# Patient Record
Sex: MALE | Race: BLACK OR AFRICAN AMERICAN | Employment: FULL TIME | URBAN - METROPOLITAN AREA
[De-identification: names, ages, dates, MRNs, and addresses within clinical notes are randomized per-mention and may not be internally consistent; named-entity substitution may affect disease eponyms.]

---
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
## 2022-03-17 ENCOUNTER — APPOINTMENT (OUTPATIENT)
Dept: CT IMAGING | Age: 22
End: 2022-03-17
Attending: EMERGENCY MEDICINE
Payer: MEDICAID

## 2022-03-17 ENCOUNTER — HOSPITAL ENCOUNTER (EMERGENCY)
Age: 22
Discharge: HOME | End: 2022-03-17
Attending: EMERGENCY MEDICINE
Payer: MEDICAID

## 2022-03-17 VITALS
DIASTOLIC BLOOD PRESSURE: 70 MMHG | BODY MASS INDEX: 27.86 KG/M2 | OXYGEN SATURATION: 100 % | HEART RATE: 87 BPM | RESPIRATION RATE: 16 BRPM | HEIGHT: 71 IN | SYSTOLIC BLOOD PRESSURE: 110 MMHG | WEIGHT: 199 LBS | TEMPERATURE: 97.9 F

## 2022-03-17 DIAGNOSIS — S06.0X0A CONCUSSION WITHOUT LOSS OF CONSCIOUSNESS, INITIAL ENCOUNTER: Primary | ICD-10-CM

## 2022-03-17 PROCEDURE — 99284 EMERGENCY DEPT VISIT MOD MDM: CPT

## 2022-03-17 PROCEDURE — 70450 CT HEAD/BRAIN W/O DYE: CPT

## 2022-03-17 RX ORDER — ONDANSETRON 4 MG/1
4 TABLET, FILM COATED ORAL
Qty: 12 TABLET | Refills: 0 | Status: SHIPPED | OUTPATIENT
Start: 2022-03-17 | End: 2022-08-24

## 2022-03-17 RX ORDER — IBUPROFEN 800 MG/1
800 TABLET ORAL EVERY 8 HOURS
Qty: 9 TABLET | Refills: 0 | Status: SHIPPED | OUTPATIENT
Start: 2022-03-17 | End: 2022-03-20

## 2022-03-17 NOTE — ED TRIAGE NOTES
Pt.AOx4, steady gait, NAD, c/o headache/dizziness after hitting head on machine @ work last week, denies LOC, denies taking blood thinners

## 2022-03-18 NOTE — ED PROVIDER NOTES
EMERGENCY DEPARTMENT HISTORY AND PHYSICAL EXAM    8:28 PM seen at this time in the triage room      Date: 3/17/2022  Patient Name: Ladonna Miles    History of Presenting Illness     Chief Complaint   Patient presents with    Headache         History Provided By: patient    Additional History (Context): Ladonna Miles is a 25 y.o. male presents with bumped his head twice today on a piece of construction equipment struck the vertex of his head. No bleeding laceration or other alarming findings. Rates his pain 2 out of 10. He notes he has to stop exertional activity. Occasional nausea. No focal neurologic deficit. Narda Franklin PCP: Willena Dakins, MD    Chief Complaint:   Duration:    Timing:    Location:   Quality:   Severity:   Modifying Factors:   Associated Symptoms:       Current Outpatient Medications   Medication Sig Dispense Refill    ibuprofen (MOTRIN) 800 mg tablet Take 1 Tablet by mouth every eight (8) hours for 3 days. 9 Tablet 0    ondansetron hcl (Zofran) 4 mg tablet Take 1 Tablet by mouth every eight (8) hours as needed for Nausea. 12 Tablet 0       Past History     Past Medical History:  History reviewed. No pertinent past medical history. Past Surgical History:  No past surgical history on file. Family History:  History reviewed. No pertinent family history. Social History:  Social History     Tobacco Use    Smoking status: Not on file    Smokeless tobacco: Not on file   Substance Use Topics    Alcohol use: Not on file    Drug use: Not on file       Allergies:  No Known Allergies      Review of Systems     Review of Systems   Constitutional: Negative for diaphoresis and fever. HENT: Negative for congestion and sore throat. Eyes: Negative for pain and itching. Respiratory: Negative for cough and shortness of breath. Cardiovascular: Negative for chest pain and palpitations. Gastrointestinal: Negative for abdominal pain and diarrhea.    Endocrine: Negative for polydipsia and polyuria. Genitourinary: Negative for dysuria and hematuria. Musculoskeletal: Negative for arthralgias and myalgias. Skin: Negative for rash and wound. Neurological: Positive for headaches. Negative for seizures and syncope. Hematological: Does not bruise/bleed easily. Psychiatric/Behavioral: Negative for agitation and hallucinations. Physical Exam       Patient Vitals for the past 12 hrs:   Temp Pulse Resp BP SpO2   03/17/22 1943 97.9 °F (36.6 °C) 87 16 110/70 100 %       IPVITALS  Patient Vitals for the past 24 hrs:   BP Temp Pulse Resp SpO2 Height Weight   03/17/22 1943 110/70 97.9 °F (36.6 °C) 87 16 100 % 5' 11\" (1.803 m) 90.3 kg (199 lb)       Physical Exam  Vitals and nursing note reviewed. Constitutional:       Appearance: He is well-developed. HENT:      Head: Normocephalic and atraumatic. Eyes:      General: No scleral icterus. Conjunctiva/sclera: Conjunctivae normal.   Neck:      Vascular: No JVD. Cardiovascular:      Rate and Rhythm: Normal rate and regular rhythm. Pulmonary:      Effort: Pulmonary effort is normal. No respiratory distress. Musculoskeletal:         General: Normal range of motion. Cervical back: Normal range of motion and neck supple. Skin:     General: Skin is warm and dry. Neurological:      General: No focal deficit present. Mental Status: He is alert. Cranial Nerves: No cranial nerve deficit. Psychiatric:         Thought Content: Thought content normal.         Judgment: Judgment normal.           Diagnostic Study Results   Labs -  No results found for this or any previous visit (from the past 24 hour(s)). Radiologic Studies -   CT HEAD WO CONT   Final Result   1. No acute intracranial process identified. CT HEAD WO CONT    Result Date: 3/17/2022  EXAM: CT of the Head without contrast INDICATION:  Struck head TECHNIQUE: CT of the head from the vertex to the skull base performed. No IV contrast administered.  All CT scans at this facility are performed using dose optimization technique as appropriate to a performed exam, to include automated exposure control, adjustment of the mA and/or kV according to patient size (including appropriate matching for site specific examination) or use of iterative reconstruction technique. COMPARISON: None. FINDINGS: No evidence of acute intra-axial or extra-axial hemorrhage. The ventricles and sulci are symmetric. No midline shift, mass effect or mass lesion appreciated. The gray-white junction is preserved. No evidence of an acute infarct identified. The mastoid air cells are well aerated. The paranasal sinuses are unremarkable. The orbits are normal. The scalp and skull are unremarkable. 1.   No acute intracranial process identified. Medications ordered:   Medications - No data to display      Medical Decision Making   Initial Medical Decision Making and DDx:  Physical exam no findings of trauma that would be concerning depressed skull fracture laceration  Patient has signs of acute concussion, headache nausea occasional lethargy or lack of energy. No focal deficit doubt intracranial lesion bleeding etc.    ED Course: Progress Notes, Reevaluation, and Consults:     8:58 PM prabhjot with the patient and father over the phone, he is concerned that the patient struck his head twice turns out the actual schedule was once last week and then again today, has been having nosebleeds. Very tangential story, had informed discussion with the 3 of us will proceed with head CT    I am the first provider for this patient. I reviewed the vital signs, available nursing notes, past medical history, past surgical history, family history and social history. Patient Vitals for the past 12 hrs:   Temp Pulse Resp BP SpO2   03/17/22 1943 97.9 °F (36.6 °C) 87 16 110/70 100 %       Vital Signs-Reviewed the patient's vital signs.     Pulse Oximetry Analysis, Cardiac Monitor, 12 lead ekg: Interpreted by the EP. Records Reviewed: Nursing notes reviewed (Time of Review: 8:28 PM)    Procedures:   Critical Care Time:   Aspirin: (was aspirin given for stroke?)    Diagnosis     Clinical Impression:   1. Concussion without loss of consciousness, initial encounter        Disposition: Discharged      Follow-up Information     Follow up With Specialties Details Why Contact Info    Alesha Savage MD Family Medicine In 3 days  [address] [facility]  [phone]             Patient's Medications   Start Taking    IBUPROFEN (MOTRIN) 800 MG TABLET    Take 1 Tablet by mouth every eight (8) hours for 3 days. ONDANSETRON HCL (ZOFRAN) 4 MG TABLET    Take 1 Tablet by mouth every eight (8) hours as needed for Nausea.    Continue Taking    No medications on file   These Medications have changed    No medications on file   Stop Taking    No medications on file     _______________________________    Notes:    Darryl Foley MD using Dragon dictation      _______________________________

## 2022-03-18 NOTE — ROUTINE PROCESS
Damari Hernandez is a 25 y.o. male that was discharged in stable. Pt was accompanied by self. Pt is driving. The patients diagnosis, condition and treatment were explained to  patient and aftercare instructions were given. The patient verbalized understanding. Patient armband removed and shredded.

## 2022-04-12 ENCOUNTER — HOSPITAL ENCOUNTER (EMERGENCY)
Age: 22
Discharge: HOME | End: 2022-04-12
Attending: STUDENT IN AN ORGANIZED HEALTH CARE EDUCATION/TRAINING PROGRAM
Payer: MEDICAID

## 2022-04-12 VITALS
DIASTOLIC BLOOD PRESSURE: 74 MMHG | SYSTOLIC BLOOD PRESSURE: 136 MMHG | HEIGHT: 72 IN | HEART RATE: 55 BPM | TEMPERATURE: 98 F | BODY MASS INDEX: 26.82 KG/M2 | OXYGEN SATURATION: 100 % | WEIGHT: 198 LBS | RESPIRATION RATE: 18 BRPM

## 2022-04-12 DIAGNOSIS — G44.52 NEW DAILY PERSISTENT HEADACHE: Primary | ICD-10-CM

## 2022-04-12 LAB
COVID-19 RAPID TEST, COVR: NOT DETECTED
SOURCE, COVRS: NORMAL

## 2022-04-12 PROCEDURE — 87635 SARS-COV-2 COVID-19 AMP PRB: CPT

## 2022-04-12 PROCEDURE — 74011250636 HC RX REV CODE- 250/636: Performed by: EMERGENCY MEDICINE

## 2022-04-12 PROCEDURE — 96372 THER/PROPH/DIAG INJ SC/IM: CPT

## 2022-04-12 PROCEDURE — 99284 EMERGENCY DEPT VISIT MOD MDM: CPT

## 2022-04-12 RX ORDER — KETOROLAC TROMETHAMINE 15 MG/ML
15 INJECTION, SOLUTION INTRAMUSCULAR; INTRAVENOUS
Status: COMPLETED | OUTPATIENT
Start: 2022-04-12 | End: 2022-04-12

## 2022-04-12 RX ORDER — METOCLOPRAMIDE HYDROCHLORIDE 5 MG/ML
10 INJECTION INTRAMUSCULAR; INTRAVENOUS
Status: COMPLETED | OUTPATIENT
Start: 2022-04-12 | End: 2022-04-12

## 2022-04-12 RX ORDER — DIPHENHYDRAMINE HYDROCHLORIDE 50 MG/ML
50 INJECTION, SOLUTION INTRAMUSCULAR; INTRAVENOUS ONCE
Status: COMPLETED | OUTPATIENT
Start: 2022-04-12 | End: 2022-04-12

## 2022-04-12 RX ADMIN — KETOROLAC TROMETHAMINE 15 MG: 15 INJECTION, SOLUTION INTRAMUSCULAR; INTRAVENOUS at 09:01

## 2022-04-12 RX ADMIN — DIPHENHYDRAMINE HYDROCHLORIDE 50 MG: 50 INJECTION, SOLUTION INTRAMUSCULAR; INTRAVENOUS at 09:02

## 2022-04-12 RX ADMIN — METOCLOPRAMIDE HYDROCHLORIDE 10 MG: 5 INJECTION INTRAMUSCULAR; INTRAVENOUS at 09:02

## 2022-04-12 NOTE — ED TRIAGE NOTES
Pt ambulatory to triage without assistance. Pt alert and oriented x 4. Pt reports frontal headache starting yesterday. Motrin at home with mild relief.   Pt rates 5/10 pain

## 2022-04-12 NOTE — Clinical Note
[facility] EMERGENCY DEPT  [address] [address] [phone]    Work/School Note    Date: 4/12/2022    To Whom It May concern:      Jerrod Li was seen and treated today in the emergency room by the following provider(s):  Attending Provider: Colen Lanes, MD  Physician Assistant: JESS Lennon. Jerrod Li is excused from work/school on 04/12/22. He is clear to return to work/school on 04/13/22.         Sincerely,          JESS Parnell

## 2022-04-12 NOTE — ED PROVIDER NOTES
EMERGENCY DEPARTMENT HISTORY AND PHYSICAL EXAM    Date: 4/12/2022  Patient Name: David Riley    History of Presenting Illness     Chief Complaint   Patient presents with    Headache         History Provided By: Patient    Additional History (Context): David Riley is a 25 y.o. male with No significant past medical history who presents with complaint of headache. He has had a headache every day since his head injury 6 weeks ago. He had a head CT after that was fine. Denies any nausea vomiting diarrhea congestion vision changes numbness or weakness. His headache is frontal.  Not taking any medications for relief. PCP: Niraj Crane MD    Current Outpatient Medications   Medication Sig Dispense Refill    ondansetron hcl (Zofran) 4 mg tablet Take 1 Tablet by mouth every eight (8) hours as needed for Nausea. 12 Tablet 0       Past History     Past Medical History:  No past medical history on file. Past Surgical History:  No past surgical history on file. Family History:  No family history on file. Social History:  Social History     Tobacco Use    Smoking status: Not on file    Smokeless tobacco: Not on file   Substance Use Topics    Alcohol use: Not on file    Drug use: Not on file       Allergies:  No Known Allergies      Review of Systems   Review of Systems   Constitutional: Negative for fatigue and fever. HENT: Negative for congestion. Eyes: Negative for visual disturbance. Gastrointestinal: Negative for diarrhea, nausea and vomiting. Musculoskeletal: Negative for gait problem, neck pain and neck stiffness. Neurological: Positive for headaches. Negative for dizziness, weakness and numbness.      All Other Systems Negative  Physical Exam     Vitals:    04/12/22 0756 04/12/22 0759   BP: (!) 83/64 136/74   Pulse: (!) 55    Resp: 18    Temp: 98 °F (36.7 °C)    SpO2: 100%    Weight: 89.8 kg (198 lb)    Height: 6' (1.829 m)      Physical Exam  Vitals and nursing note reviewed. Constitutional:       General: He is not in acute distress. Appearance: Normal appearance. He is well-developed and normal weight. He is not ill-appearing, toxic-appearing or diaphoretic. HENT:      Head: Normocephalic and atraumatic. Eyes:      Extraocular Movements: Extraocular movements intact. Neck:      Thyroid: No thyromegaly. Vascular: No carotid bruit. Trachea: No tracheal deviation. Cardiovascular:      Rate and Rhythm: Normal rate and regular rhythm. Heart sounds: Normal heart sounds. No murmur heard. No friction rub. No gallop. Pulmonary:      Effort: Pulmonary effort is normal. No respiratory distress. Breath sounds: Normal breath sounds. No stridor. No wheezing or rales. Chest:      Chest wall: No tenderness. Abdominal:      General: There is no distension. Palpations: Abdomen is soft. There is no mass. Tenderness: There is no abdominal tenderness. There is no guarding or rebound. Musculoskeletal:         General: Normal range of motion. Cervical back: Normal range of motion and neck supple. Skin:     General: Skin is warm and dry. Coloration: Skin is not pale. Neurological:      Mental Status: He is alert. Psychiatric:         Speech: Speech normal.         Behavior: Behavior normal.         Thought Content: Thought content normal.         Judgment: Judgment normal.              Diagnostic Study Results     Labs -     Recent Results (from the past 12 hour(s))   COVID-19 RAPID TEST    Collection Time: 04/12/22  8:48 AM   Result Value Ref Range    Specimen source Nasopharyngeal      COVID-19 rapid test Not detected NOTD         Radiologic Studies -   No orders to display     CT Results  (Last 48 hours)    None        CXR Results  (Last 48 hours)    None            Medical Decision Making   I am the first provider for this patient.     I reviewed the vital signs, available nursing notes, past medical history, past surgical history, family history and social history. Vital Signs-Reviewed the patient's vital signs. Records Reviewed: Nursing Notes    Procedures:  Procedures    Provider Notes (Medical Decision Making): Negative CT. Very unremarkable exam.  Negative Covid. Would have him follow-up with neurology as an outpatient. Migraine cocktail improved for headache. MED RECONCILIATION:  No current facility-administered medications for this encounter. Current Outpatient Medications   Medication Sig    ondansetron hcl (Zofran) 4 mg tablet Take 1 Tablet by mouth every eight (8) hours as needed for Nausea. Disposition:  home    DISCHARGE NOTE:   9:59 AM    Pt has been reexamined. Patient has no new complaints, changes, or physical findings. Care plan outlined and precautions discussed. Results of labs were reviewed with the patient. All medications were reviewed with the patient. All of pt's questions and concerns were addressed. Patient was instructed and agrees to follow up with neurology, as well as to return to the ED upon further deterioration. Patient is ready to go home. Follow-up Information     Follow up With Specialties Details Why Contact Info    Wendy Coppola MD Family Medicine Schedule an appointment as soon as possible for a visit in 2 days  [address] [place] [address] Dr Hoang Guerrero MD Neurology Schedule an appointment as soon as possible for a visit in 1 day  [address] [facility] EMERGENCY DEPT Emergency Medicine  If symptoms worsen return immediately [address]  [phone]          Current Discharge Medication List            Diagnosis     Clinical Impression:   1.  New daily persistent headache

## 2022-08-24 ENCOUNTER — OFFICE VISIT (OUTPATIENT)
Dept: NEUROLOGY | Age: 22
End: 2022-08-24
Payer: MEDICAID

## 2022-08-24 VITALS
RESPIRATION RATE: 16 BRPM | HEART RATE: 63 BPM | OXYGEN SATURATION: 97 % | WEIGHT: 197 LBS | DIASTOLIC BLOOD PRESSURE: 60 MMHG | HEIGHT: 72 IN | BODY MASS INDEX: 26.68 KG/M2 | SYSTOLIC BLOOD PRESSURE: 104 MMHG

## 2022-08-24 DIAGNOSIS — G44.311 INTRACTABLE ACUTE POST-TRAUMATIC HEADACHE: Primary | ICD-10-CM

## 2022-08-24 PROCEDURE — 99204 OFFICE O/P NEW MOD 45 MIN: CPT | Performed by: STUDENT IN AN ORGANIZED HEALTH CARE EDUCATION/TRAINING PROGRAM

## 2022-08-24 RX ORDER — BUTALBITAL, ACETAMINOPHEN AND CAFFEINE 50; 325; 40 MG/1; MG/1; MG/1
1 TABLET ORAL
Qty: 20 TABLET | Refills: 3 | Status: SHIPPED | OUTPATIENT
Start: 2022-08-24

## 2022-08-24 RX ORDER — AMITRIPTYLINE HYDROCHLORIDE 25 MG/1
25 TABLET, FILM COATED ORAL
Qty: 30 TABLET | Refills: 3 | Status: SHIPPED | OUTPATIENT
Start: 2022-08-24 | End: 2022-09-23

## 2022-08-24 NOTE — PROGRESS NOTES
Kristen Hernandez is a 23 y.o. male . presents for Migraine    A 23years old male patient with medical history of ADHD [self-reported] not currently taking any medications referred here for evaluation of headache. In March 2022, he bumped into the forklift he was working on his forehead at his workplace. He loses consciousness. No laceration or swelling over the forehead. He started to have headaches since then. Headaches are over the bilateral frontal area and might also involve the vertex. It is aching and throbbing sensation. Most of the time, is around 3-4/10 but occasionally might increase to 9/10. Severe headache lasts only for few seconds where he has to put his head down. Not associated with nausea or vomiting. No photophobia or phonophobia. No changes in his vision. No diplopia. He occasionally feels dizzy/lightheaded when he tries to move fast.  He currently has headaches almost every day. Sometimes might wake him up from sleep. He was seen in the emergency room at the time of the accident on March 17, 2022. Had a CT scan of the brain which was unremarkable. He was given ibuprofen and Zofran. Claims ibuprofen is not helping. He also has over-the-counter Excedrin which is not helping either. Returned back to the emergency room for persistent headache on April 12. He was also given sumatriptan 25 mg; said is not helping. He denied any previous history of headache before the accident. He had COVID infection in January 2021; was not admitted to the hospital.  Since then, he has difficulty sleeping at nighttime. He does not have any weakness of his extremities. And difficulty with his balance. No forgetfulness. He is still working as a . Sometimes might get severe headaches at his workplace. Review of Systems   Constitutional:  Positive for weight loss. Negative for chills and fever. HENT:  Negative for hearing loss and tinnitus.     Eyes:  Negative for blurred vision and double vision. Respiratory:  Negative for cough and shortness of breath. Cardiovascular:  Negative for chest pain and leg swelling. Gastrointestinal:  Positive for heartburn. Negative for nausea and vomiting. Genitourinary:  Negative for dysuria, frequency and urgency. Musculoskeletal:  Positive for back pain and neck pain. Skin:  Negative for itching and rash. Neurological:  Positive for dizziness (soemtimes) and headaches. Negative for tingling, tremors, sensory change, speech change, focal weakness, seizures and loss of consciousness. Psychiatric/Behavioral:  Negative for depression and memory loss. The patient has insomnia. The patient is not nervous/anxious. History reviewed. No pertinent past medical history. No past surgical history on file. No family history on file. Social History     Socioeconomic History    Marital status: SINGLE     Spouse name: Not on file    Number of children: Not on file    Years of education: Not on file    Highest education level: Not on file   Occupational History    Not on file   Tobacco Use    Smoking status: Never    Smokeless tobacco: Never   Substance and Sexual Activity    Alcohol use: Not on file    Drug use: Not on file    Sexual activity: Not on file   Other Topics Concern    Not on file   Social History Narrative    Not on file     Social Determinants of Health     Financial Resource Strain: Not on file   Food Insecurity: Not on file   Transportation Needs: Not on file   Physical Activity: Not on file   Stress: Not on file   Social Connections: Not on file   Intimate Partner Violence: Not on file   Housing Stability: Not on file        No Known Allergies      Current Outpatient Medications   Medication Sig Dispense Refill    amitriptyline (ELAVIL) 25 mg tablet Take 1 Tablet by mouth nightly for 30 days.  30 Tablet 3    butalbital-acetaminophen-caffeine (FIORICET, ESGIC) -40 mg per tablet Take 1 Tablet by mouth every twelve (12) hours as needed for Headache. 20 Tablet 3         Physical Exam  Constitutional:       Appearance: Normal appearance. HENT:      Head: Normocephalic and atraumatic. Mouth/Throat:      Mouth: Mucous membranes are moist.      Pharynx: Oropharynx is clear. No oropharyngeal exudate. Eyes:      Extraocular Movements: Extraocular movements intact. Pupils: Pupils are equal, round, and reactive to light. Pulmonary:      Effort: Pulmonary effort is normal. No respiratory distress. Musculoskeletal:         General: Normal range of motion. Cervical back: Normal range of motion and neck supple. Right lower leg: No edema. Left lower leg: No edema. Neurological:      Mental Status: He is alert. Comments: Mental status: Awake, alert, oriented x3, follows simple and complex commands, no neglect, no extinction to DSS or VSS, immediate recall 3/3 and delayed recall 2/3. Speech and languge: fluent, coherent,  and comprehension intact  CN: VFF, EOMI, PERRLA, face sensation intact , no facial asymmetry noted, palate elevation symmetric bilat, SS+SCM 5/5 bilat, tongue midline  Motor: no pronator drift, tone normal throughout, strength 5/5 throughout  Sensory: intact to light touch and PP  throughout. Negative Romberg. Coordination: FNF accurate w/o dysmetria. DTR: 2+ throughout  Gait: Normal.             No visits with results within 3 Month(s) from this visit. Latest known visit with results is:   Admission on 04/12/2022, Discharged on 04/12/2022   Component Date Value Ref Range Status    Specimen source 04/12/2022 Nasopharyngeal    Final    COVID-19 rapid test 04/12/2022 Not detected  NOTD   Final    Comment: Rapid Abbott ID Now       Rapid NAAT:  The specimen is NEGATIVE for SARS-CoV-2, the novel coronavirus associated with COVID-19.        Negative results should be treated as presumptive and, if inconsistent with clinical signs and symptoms or necessary for patient management, should be tested with an alternative molecular assay. Negative results do not preclude SARS-CoV-2 infection and should not be used as the sole basis for patient management decisions. This test has been authorized by the FDA under an Emergency Use Authorization (EUA) for use by authorized laboratories. Fact sheet for Healthcare Providers: ConventionUpdate.co.nz  Fact sheet for Patients: ConventionUpdate.co.nz       Methodology: Isothermal Nucleic Acid Amplification               ICD-10-CM ICD-9-CM    1. Intractable acute post-traumatic headache  G44.311 339.21 amitriptyline (ELAVIL) 25 mg tablet      butalbital-acetaminophen-caffeine (FIORICET, ESGIC) -40 mg per tablet        A 23years old male patient referred here for evaluation of headache after trauma on March 17, 2022. He bumped in to a forklift with his forehead . No loss of consciousness. Started to have headaches since after the trauma. Has daily persistent headache since then. Mostly milder headaches where he cannot function [still working as a ]; but sometimes might have severe attacks lasting for few seconds. Over-the-counter medications not helping. Was also given sumatriptan 25 mg which he is not taking currently. Neuro exam is nonfocal.  CT scan of the brain from March 17 was also normal.  Posttraumatic headaches are common with mild traumas; can present as either tension type or migraine-like headaches. We will start him on amitriptyline 25 mg to be taken nightly. I will also start him on Fioricet for acute attacks. Discussed the need to limit use of pain medications to not more than 2 or 3 days a week. Discussed the need for regular sleep, regular eating, and exercise. We will see him again in 3 months time.

## 2022-08-24 NOTE — PROGRESS NOTES
Damon Vizcaino presents today for   Chief Complaint   Patient presents with    Migraine       Is someone accompanying this pt? no    Is the patient using any DME equipment during OV? no    Depression Screening:  No flowsheet data found. Learning Assessment:  No flowsheet data found. Abuse Screening:  No flowsheet data found. Fall Risk  No flowsheet data found. Coordination of Care:  1. Have you been to the ER, urgent care clinic since your last visit? Hospitalized since your last visit? no    2. Have you seen or consulted any other health care providers outside of the [address] [place] [address] since your last visit? Include any pap smears or colon screening.  no

## 2022-08-24 NOTE — LETTER
8/24/2022    Patient: Desmond Contreras   YOB: 2000   Date of Visit: 8/24/2022     Tobias Neal MD  [address] [address]  Via Fax: [address], PA-C  [address] [address]  Via Fax: [phone]    Dear Josephus Ruddle, MD Valaria Halsted, PA-C,      Thank you for referring Mr. Desmond Contreras to [facility] for evaluation. My notes for this consultation are attached. If you have questions, please do not hesitate to call me. I look forward to following your patient along with you.       Sincerely,    Laurent Herrera MD

## 2022-12-01 ENCOUNTER — OFFICE VISIT (OUTPATIENT)
Dept: NEUROLOGY | Age: 22
End: 2022-12-01
Payer: MEDICAID

## 2022-12-01 VITALS
OXYGEN SATURATION: 97 % | BODY MASS INDEX: 27.3 KG/M2 | WEIGHT: 201.6 LBS | HEIGHT: 72 IN | RESPIRATION RATE: 18 BRPM | HEART RATE: 74 BPM | DIASTOLIC BLOOD PRESSURE: 80 MMHG | SYSTOLIC BLOOD PRESSURE: 124 MMHG

## 2022-12-01 DIAGNOSIS — R51.9 CHRONIC DAILY HEADACHE: ICD-10-CM

## 2022-12-01 DIAGNOSIS — R51.9 CHRONIC DAILY HEADACHE: Primary | ICD-10-CM

## 2022-12-01 RX ORDER — LANOLIN ALCOHOL/MO/W.PET/CERES
CREAM (GRAM) TOPICAL
COMMUNITY
End: 2022-12-01

## 2022-12-01 RX ORDER — DOXYCYCLINE 100 MG/1
100 CAPSULE ORAL 2 TIMES DAILY
COMMUNITY
End: 2022-12-01

## 2022-12-01 RX ORDER — ERGOCALCIFEROL 1.25 MG/1
50000 CAPSULE ORAL
COMMUNITY
End: 2022-12-01

## 2022-12-01 RX ORDER — TRAZODONE HYDROCHLORIDE 100 MG/1
100 TABLET ORAL
COMMUNITY
End: 2022-12-01

## 2022-12-01 RX ORDER — MUPIROCIN CALCIUM 20 MG/G
CREAM TOPICAL 2 TIMES DAILY
COMMUNITY
End: 2022-12-01

## 2022-12-01 RX ORDER — BUPROPION HYDROCHLORIDE 150 MG/1
150 TABLET ORAL DAILY
COMMUNITY
End: 2022-12-01

## 2022-12-01 RX ORDER — TRAZODONE HYDROCHLORIDE 150 MG/1
150 TABLET ORAL
COMMUNITY
End: 2022-12-01

## 2022-12-01 RX ORDER — TOPIRAMATE 50 MG/1
TABLET, FILM COATED ORAL 2 TIMES DAILY
COMMUNITY
End: 2022-12-01

## 2022-12-01 RX ORDER — AMITRIPTYLINE HYDROCHLORIDE 25 MG/1
TABLET, FILM COATED ORAL
COMMUNITY
End: 2022-12-01

## 2022-12-01 NOTE — PROGRESS NOTES
Damari Hernandez is a 23 y.o. male . presents for Follow-up and Migraine    A 23years old male patient here for follow-up of headache. Last seen in the clinic in August 2022. He was started on Elavil 25 mg p.o. nightly for possible chronic tension-type headache/posttraumatic headache. Was also given Fioricet for acute attacks. He is not currently taking any of those medications. Claims none of the medications he has tried are working. His primary care provider has given him topiramate 50 mg; he is not taking it either. Had been on trazodone 150 mg nightly to help him with sleep which she subsequently discontinued. Currently, he has headaches almost every day. Headaches are bilateral/bifrontal and temporal.  Might be throbbing. Sometimes might get severe where he has to sit down. Not associated with nausea or vomiting. No photophobia or phonophobia. Currently, takes [facility] powder which might help a little. Do not have any significant changes in his vision. No diplopia. No weakness of his extremities. From initial encounter:  A 23years old male patient with medical history of ADHD [self-reported] not currently taking any medications referred here for evaluation of headache. In March 2022, he bumped into the forklift he was working on his forehead at his workplace. He loses consciousness. No laceration or swelling over the forehead. He started to have headaches since then. Headaches are over the bilateral frontal area and might also involve the vertex. It is aching and throbbing sensation. Most of the time, is around 3-4/10 but occasionally might increase to 9/10. Severe headache lasts only for few seconds where he has to put his head down. Not associated with nausea or vomiting. No photophobia or phonophobia. No changes in his vision. No diplopia. He occasionally feels dizzy/lightheaded when he tries to move fast.  He currently has headaches almost every day.   Sometimes might wake him up from sleep. He was seen in the emergency room at the time of the accident on March 17, 2022. Had a CT scan of the brain which was unremarkable. He was given ibuprofen and Zofran. Claims ibuprofen is not helping. He also has over-the-counter Excedrin which is not helping either. Returned back to the emergency room for persistent headache on April 12. He was also given sumatriptan 25 mg; said is not helping. He denied any previous history of headache before the accident. He had COVID infection in January 2021; was not admitted to the hospital.  Since then, he has difficulty sleeping at nighttime. He does not have any weakness of his extremities. And difficulty with his balance. No forgetfulness. He is still working as a . Sometimes might get severe headaches at his workplace. Review of Systems   Constitutional:  Negative for chills, fever and weight loss. HENT:  Negative for hearing loss and tinnitus. Eyes:  Negative for blurred vision and double vision. Respiratory:  Negative for cough and shortness of breath. Cardiovascular:  Negative for chest pain and leg swelling. Gastrointestinal:  Positive for heartburn. Negative for nausea and vomiting. Genitourinary:  Negative for dysuria, frequency and urgency. Musculoskeletal:  Negative for back pain and neck pain. Skin:  Negative for itching and rash. Neurological:  Positive for dizziness (soemtimes), tingling (shoulder areas) and headaches. Negative for tremors, sensory change, speech change, focal weakness, seizures and loss of consciousness. Psychiatric/Behavioral:  Negative for depression. The patient is nervous/anxious. The patient does not have insomnia. No past medical history on file. No past surgical history on file. No family history on file.      Social History     Socioeconomic History    Marital status: SINGLE     Spouse name: Not on file    Number of children: Not on file    Years of education: Not on file    Highest education level: Not on file   Occupational History    Not on file   Tobacco Use    Smoking status: Never    Smokeless tobacco: Never   Substance and Sexual Activity    Alcohol use: Not on file    Drug use: Not on file    Sexual activity: Not on file   Other Topics Concern    Not on file   Social History Narrative    Not on file     Social Determinants of Health     Financial Resource Strain: Not on file   Food Insecurity: Not on file   Transportation Needs: Not on file   Physical Activity: Not on file   Stress: Not on file   Social Connections: Not on file   Intimate Partner Violence: Not on file   Housing Stability: Not on file        No Known Allergies      REM        Physical Exam  Constitutional:       Appearance: Normal appearance. HENT:      Head: Normocephalic and atraumatic. Mouth/Throat:      Mouth: Mucous membranes are moist.      Pharynx: Oropharynx is clear. No oropharyngeal exudate. Eyes:      Extraocular Movements: Extraocular movements intact. Pupils: Pupils are equal, round, and reactive to light. Pulmonary:      Effort: Pulmonary effort is normal. No respiratory distress. Musculoskeletal:         General: Normal range of motion. Cervical back: Normal range of motion and neck supple. Right lower leg: No edema. Left lower leg: No edema. Neurological:      Mental Status: He is alert. Comments: Mental status: Awake, alert, oriented x3, follows simple and complex commands, no neglect, no extinction. Speech and languge: fluent, coherent,  and comprehension intact  CN: VFF, EOMI, PERRLA, face sensation intact , no facial asymmetry noted, palate elevation symmetric bilat, SS+SCM 5/5 bilat, tongue midline  Motor: no pronator drift, tone normal throughout, strength 5/5 throughout  Sensory: intact to light touch and PP  throughout. Negative Romberg. Coordination: FNF accurate w/o dysmetria.    DTR: 2+ throughout  Gait: Normal. No visits with results within 3 Month(s) from this visit. Latest known visit with results is:   Admission on 04/12/2022, Discharged on 04/12/2022   Component Date Value Ref Range Status    Specimen source 04/12/2022 Nasopharyngeal    Final    COVID-19 rapid test 04/12/2022 Not detected  NOTD   Final    Comment: Rapid Abbott ID Now       Rapid NAAT:  The specimen is NEGATIVE for SARS-CoV-2, the novel coronavirus associated with COVID-19. Negative results should be treated as presumptive and, if inconsistent with clinical signs and symptoms or necessary for patient management, should be tested with an alternative molecular assay. Negative results do not preclude SARS-CoV-2 infection and should not be used as the sole basis for patient management decisions. This test has been authorized by the FDA under an Emergency Use Authorization (EUA) for use by authorized laboratories. Fact sheet for Healthcare Providers: [employer]  Fact sheet for Patients: [employer]       Methodology: Isothermal Nucleic Acid Amplification               ICD-10-CM ICD-9-CM    1. Intractable acute post-traumatic headache  G44.311 339.21       2. Chronic daily headache  R51.9 784.0 MRI BRAIN WO CONT      3. Intractable chronic post-traumatic headache  G44.321 339.22         He currently has persistent headache. He tried Elavil 25 mg p.o. nightly; he says only for about 3 weeks and stopped it. Also has tried topiramate and was not working. Not currently taking any medications. Sometimes takes [facility] powder which might help. On exam, patient do not have any focal neurodeficits. Possible chronic tension-type headache. Since he has persistent headache and had previous minor trauma, will get MRI of the brain. We will call him with results and discuss prophylactic options after the MRI. We will see him in 3 months time.

## 2022-12-16 ENCOUNTER — HOSPITAL ENCOUNTER (EMERGENCY)
Age: 22
Discharge: HOME | End: 2022-12-16
Attending: EMERGENCY MEDICINE

## 2022-12-16 VITALS
WEIGHT: 201 LBS | TEMPERATURE: 97.6 F | BODY MASS INDEX: 27.26 KG/M2 | DIASTOLIC BLOOD PRESSURE: 76 MMHG | RESPIRATION RATE: 16 BRPM | SYSTOLIC BLOOD PRESSURE: 133 MMHG | OXYGEN SATURATION: 99 % | HEART RATE: 76 BPM

## 2022-12-16 DIAGNOSIS — J01.00 ACUTE MAXILLARY SINUSITIS, RECURRENCE NOT SPECIFIED: Primary | ICD-10-CM

## 2022-12-16 PROCEDURE — 74011250637 HC RX REV CODE- 250/637: Performed by: EMERGENCY MEDICINE

## 2022-12-16 PROCEDURE — 99283 EMERGENCY DEPT VISIT LOW MDM: CPT

## 2022-12-16 RX ORDER — AMOXICILLIN AND CLAVULANATE POTASSIUM 875; 125 MG/1; MG/1
1 TABLET, FILM COATED ORAL 2 TIMES DAILY
Qty: 20 TABLET | Refills: 0 | Status: SHIPPED | OUTPATIENT
Start: 2022-12-16 | End: 2022-12-26

## 2022-12-16 RX ORDER — IBUPROFEN 400 MG/1
800 TABLET ORAL
Status: COMPLETED | OUTPATIENT
Start: 2022-12-16 | End: 2022-12-16

## 2022-12-16 RX ORDER — IBUPROFEN 600 MG/1
600 TABLET ORAL
Qty: 20 TABLET | Refills: 0 | Status: SHIPPED | OUTPATIENT
Start: 2022-12-16

## 2022-12-16 RX ORDER — AMOXICILLIN AND CLAVULANATE POTASSIUM 875; 125 MG/1; MG/1
1 TABLET, FILM COATED ORAL
Status: COMPLETED | OUTPATIENT
Start: 2022-12-16 | End: 2022-12-16

## 2022-12-16 RX ORDER — AMOXICILLIN 250 MG/1
500 CAPSULE ORAL
Status: DISCONTINUED | OUTPATIENT
Start: 2022-12-16 | End: 2022-12-16

## 2022-12-16 RX ADMIN — AMOXICILLIN AND CLAVULANATE POTASSIUM 1 TABLET: 875; 125 TABLET, FILM COATED ORAL at 03:05

## 2022-12-16 RX ADMIN — IBUPROFEN 800 MG: 400 TABLET, FILM COATED ORAL at 03:05

## 2022-12-16 NOTE — ED NOTES
Pt A/Ox4 resting on stretcher comfortably. Pt c/o headache pain 6/10. Pt medicated per [facility], will continue to monitor and assess.

## 2022-12-16 NOTE — ED PROVIDER NOTES
EMERGENCY DEPARTMENT HISTORY AND PHYSICAL EXAM      Date: 12/16/2022  Patient Name: Nat Em      History of Presenting Illness     Chief Complaint   Patient presents with    Headache    Sinus Pain       Location/Duration/Severity/Modifying factors   Chief Complaint   Patient presents with    Headache    Sinus Pain       HPI:  Nat Em is a 23 y.o. male with history as listed presents with a concern of bilateral ear pain x 5 days. \    Location: both ears  Duration: 5 days   Quality: moderate  Severity: severe  Modifying Factors: none    Additional History: nonre        There are no other complaints, changes, or physical findings at this time. PCP: Gee Thornton MD    Current Outpatient Medications   Medication Sig Dispense Refill    amoxicillin-clavulanate (Augmentin) 875-125 mg per tablet Take 1 Tablet by mouth two (2) times a day for 10 days. 20 Tablet 0    ibuprofen (MOTRIN) 600 mg tablet Take 1 Tablet by mouth every six (6) hours as needed for Pain. 20 Tablet 0       Past History     Past Medical History:  History reviewed. No pertinent past medical history. Past Surgical History:  No past surgical history on file. Family History:  History reviewed. No pertinent family history. Social History:  Social History     Tobacco Use    Smoking status: Never    Smokeless tobacco: Never       Allergies:  No Known Allergies      Review of Systems     Review of Systems   Constitutional: Negative. HENT:  Positive for ear pain, sinus pressure and sore throat. Eyes: Negative. Respiratory: Negative. Cardiovascular: Negative. Gastrointestinal: Negative. Endocrine: Negative. Genitourinary: Negative. Musculoskeletal: Negative. Skin: Negative. Allergic/Immunologic: Negative. Neurological: Negative. Hematological: Negative. Psychiatric/Behavioral: Negative. All other systems reviewed and are negative.     Physical Exam     Physical Exam  Vitals and nursing note reviewed. Constitutional:       General: He is not in acute distress. Appearance: He is well-developed. HENT:      Head: Normocephalic. Comments: Both TM's: dull and erythematous    Eyes:      Conjunctiva/sclera: Conjunctivae normal.      Pupils: Pupils are equal, round, and reactive to light. Cardiovascular:      Rate and Rhythm: Normal rate and regular rhythm. Heart sounds: Normal heart sounds. No murmur heard. Pulmonary:      Effort: Pulmonary effort is normal. No respiratory distress. Breath sounds: Normal breath sounds. No wheezing or rales. Chest:      Chest wall: No tenderness. Abdominal:      General: Bowel sounds are normal. There is no distension. Palpations: Abdomen is soft. Tenderness: There is no abdominal tenderness. There is no rebound. Musculoskeletal:         General: No tenderness. Normal range of motion. Cervical back: Normal range of motion and neck supple. Skin:     General: Skin is warm and dry. Findings: No rash. Neurological:      Mental Status: He is alert and oriented to person, place, and time. Cranial Nerves: No cranial nerve deficit. Motor: No abnormal muscle tone. Coordination: Coordination normal.   Psychiatric:         Behavior: Behavior normal.         Thought Content: Thought content normal.         Judgment: Judgment normal.       Lab and Diagnostic Study Results     Labs -    Radiologic Studies - none        Procedures and Critical Care       Performed by: Dee Quan MD    Procedures         Dee Quan MD    Medical Decision Making and ED Course   - I am the first and primary provider for this patient AND AM THE PRIMARY PROVIDER OF RECORD. - I reviewed the vital signs, available nursing notes, past medical history, past surgical history, family history and social history. - Initial assessment performed.  The patients presenting problems have been discussed, and the staff are in agreement with the care plan formulated and outlined with them. I have encouraged them to ask questions as they arise throughout their visit. Vital Signs-Reviewed the patient's vital signs. Patient Vitals for the past 12 hrs:   Temp Pulse Resp BP SpO2   12/16/22 0238 97.6 °F (36.4 °C) 76 16 133/76 99 %         Provider Notes (Medical Decision Making): sinusitis, URI, viral syndrome    MDM     ED Course:     Patient remained stable thorugh out ER evaluation   ------------------------------------------------------------------------------------------------------------        Consultations:       Consultations:       Disposition   D/C - home    Rx: agumentin, motrin    F/U PCP in 3 to 4 days   Return to ER prn. Diagnosis     Clinical Impression:   1.  Acute maxillary sinusitis, recurrence not specified        Attestations:    Jana Herrera MD

## 2022-12-16 NOTE — ED TRIAGE NOTES
Patient c/o sinus pain and headache. Note Chart review showing patient seen and treated tonight at [place] [id number] for same.

## 2022-12-16 NOTE — DISCHARGE INSTRUCTIONS
Sinusitis: Care Instructions  Your Care Instructions     Sinusitis is an infection of the lining of the sinus cavities in your head. Sinusitis often follows a cold. It causes pain and pressure in your head and face. In most cases, sinusitis gets better on its own in 1 to 2 weeks. But some mild symptoms may last for several weeks. Sometimes antibiotics are needed. Follow-up care is a key part of your treatment and safety. Be sure to make and go to all appointments, and call your doctor if you are having problems. It's also a good idea to know your test results and keep a list of the medicines you take. How can you care for yourself at home? Take an over-the-counter pain medicine, such as acetaminophen (Tylenol), ibuprofen (Advil, Motrin), or naproxen (Aleve). Read and follow all instructions on the label. If the doctor prescribed antibiotics, take them as directed. Do not stop taking them just because you feel better. You need to take the full course of antibiotics. Be careful when taking over-the-counter cold or flu medicines and Tylenol at the same time. Many of these medicines have acetaminophen, which is Tylenol. Read the labels to make sure that you are not taking more than the recommended dose. Too much acetaminophen (Tylenol) can be harmful. Breathe warm, moist air from a steamy shower, a hot bath, or a sink filled with hot water. Avoid cold, dry air. Using a humidifier in your home may help. Follow the directions for cleaning the machine. Use saline (saltwater) nasal washes. This can help keep your nasal passages open and wash out mucus and bacteria. You can buy saline nose drops at a grocery store or drugstore. Or you can make your own at home by adding 1 teaspoon of salt and 1 teaspoon of baking soda to 2 cups of distilled water. If you make your own, fill a bulb syringe with the solution, insert the tip into your nostril, and squeeze gently. [employer] your nose.   Put a hot, wet towel or a warm gel pack on your face 3 or 4 times a day for 5 to 10 minutes each time. Try a decongestant nasal spray like oxymetazoline (Afrin). Do not use it for more than 3 days in a row. Using it for more than 3 days can make your congestion worse. When should you call for help? Call your doctor now or seek immediate medical care if:    You have new or worse swelling or redness in your face or around your eyes. You have a new or higher fever. Watch closely for changes in your health, and be sure to contact your doctor if:    You have new or worse facial pain. The mucus from your nose becomes thicker (like pus) or has new blood in it. You are not getting better as expected. Where can you learn more? Go to http://www.gray.com/  Enter V206 in the search box to learn more about \"Sinusitis: Care Instructions. \"  Current as of: May 4, 2022               Content Version: 13.4  © 2006-2022 [employer]. Care instructions adapted under license by [employer] (which disclaims liability or warranty for this information). If you have questions about a medical condition or this instruction, always ask your healthcare professional. [address] any warranty or liability for your use of this information.

## 2022-12-16 NOTE — ED NOTES
Discharge reviewed with pt, hard scripts given to patient. VSS, all questions and concerns have been addressed. Pt exited treatment ambulatory.

## 2022-12-19 ENCOUNTER — TELEPHONE (OUTPATIENT)
Dept: NEUROLOGY | Age: 22
End: 2022-12-19

## 2022-12-23 ENCOUNTER — HOSPITAL ENCOUNTER (OUTPATIENT)
Age: 22
Discharge: HOME | End: 2022-12-23
Attending: STUDENT IN AN ORGANIZED HEALTH CARE EDUCATION/TRAINING PROGRAM
Payer: MEDICAID

## 2022-12-23 PROCEDURE — 70551 MRI BRAIN STEM W/O DYE: CPT

## 2023-01-10 ENCOUNTER — HOSPITAL ENCOUNTER (EMERGENCY)
Age: 23
Discharge: HOME | End: 2023-01-11
Attending: EMERGENCY MEDICINE
Payer: MEDICAID

## 2023-01-10 VITALS
RESPIRATION RATE: 18 BRPM | OXYGEN SATURATION: 97 % | TEMPERATURE: 97.7 F | HEIGHT: 72 IN | SYSTOLIC BLOOD PRESSURE: 140 MMHG | DIASTOLIC BLOOD PRESSURE: 86 MMHG | HEART RATE: 70 BPM | WEIGHT: 202 LBS | BODY MASS INDEX: 27.36 KG/M2

## 2023-01-10 DIAGNOSIS — R51.9 CHRONIC NONINTRACTABLE HEADACHE, UNSPECIFIED HEADACHE TYPE: Primary | ICD-10-CM

## 2023-01-10 DIAGNOSIS — G89.29 CHRONIC NONINTRACTABLE HEADACHE, UNSPECIFIED HEADACHE TYPE: Primary | ICD-10-CM

## 2023-01-10 PROCEDURE — 96372 THER/PROPH/DIAG INJ SC/IM: CPT

## 2023-01-10 PROCEDURE — 99284 EMERGENCY DEPT VISIT MOD MDM: CPT

## 2023-01-11 PROCEDURE — 96372 THER/PROPH/DIAG INJ SC/IM: CPT

## 2023-01-11 PROCEDURE — 74011250636 HC RX REV CODE- 250/636: Performed by: EMERGENCY MEDICINE

## 2023-01-11 RX ORDER — BUTALBITAL, ASPIRIN, CAFFEINE AND CODEINE PHOSPHATE 50; 325; 40; 30 MG/1; MG/1; MG/1; MG/1
1 CAPSULE ORAL
Qty: 20 CAPSULE | Refills: 0 | Status: SHIPPED | OUTPATIENT
Start: 2023-01-11 | End: 2023-01-18

## 2023-01-11 RX ORDER — ONDANSETRON 4 MG/1
4 TABLET, ORALLY DISINTEGRATING ORAL
Status: COMPLETED | OUTPATIENT
Start: 2023-01-11 | End: 2023-01-11

## 2023-01-11 RX ORDER — METOCLOPRAMIDE 10 MG/1
10 TABLET ORAL
Qty: 12 TABLET | Refills: 0 | Status: SHIPPED | OUTPATIENT
Start: 2023-01-11 | End: 2023-01-21

## 2023-01-11 RX ORDER — KETOROLAC TROMETHAMINE 15 MG/ML
15 INJECTION, SOLUTION INTRAMUSCULAR; INTRAVENOUS
Status: COMPLETED | OUTPATIENT
Start: 2023-01-11 | End: 2023-01-11

## 2023-01-11 RX ADMIN — KETOROLAC TROMETHAMINE 15 MG: 15 INJECTION, SOLUTION INTRAMUSCULAR; INTRAVENOUS at 01:19

## 2023-01-11 RX ADMIN — ONDANSETRON 4 MG: 4 TABLET, ORALLY DISINTEGRATING ORAL at 01:19

## 2023-01-11 NOTE — ED PROVIDER NOTES
Pt c/o headache, wax/waning x 10 months. Frontal and b/l post head. No change. Wax/waning daily seen by neurology and given rx, unsure what for. But didn't help so stopped taking meds. Says Laura Arpan helps, but didn't smoke today so headache continues. No fever. No cough  no neck or back pain. No weakness or numbness. No fever/chills, no cough . History reviewed. No pertinent past medical history. History reviewed. No pertinent surgical history. History reviewed. No pertinent family history. Social History     Socioeconomic History    Marital status: SINGLE     Spouse name: Not on file    Number of children: Not on file    Years of education: Not on file    Highest education level: Not on file   Occupational History    Not on file   Tobacco Use    Smoking status: Never    Smokeless tobacco: Never   Substance and Sexual Activity    Alcohol use: Not Currently    Drug use: Yes     Types: Marijuana    Sexual activity: Not on file   Other Topics Concern    Not on file   Social History Narrative    Not on file     Social Determinants of Health     Financial Resource Strain: Not on file   Food Insecurity: Not on file   Transportation Needs: Not on file   Physical Activity: Not on file   Stress: Not on file   Social Connections: Not on file   Intimate Partner Violence: Not on file   Housing Stability: Not on file         ALLERGIES: Patient has no known allergies. Review of Systems   Constitutional:  Negative for diaphoresis and fever. HENT:  Negative for congestion. Respiratory:  Negative for cough and shortness of breath. Cardiovascular:  Negative for chest pain. Gastrointestinal:  Negative for abdominal pain and nausea. Musculoskeletal:  Negative for back pain. Skin:  Negative for rash. Neurological:  Positive for headaches. Negative for dizziness. All other systems reviewed and are negative.     Vitals:    01/10/23 2222   BP: (!) 140/86   Pulse: 70   Resp: 18   Temp: 97.7 °F (36.5 °C)   SpO2: 97%   Weight: 91.6 kg (202 lb)   Height: 6' (1.829 m)            Physical Exam  Vitals and nursing note reviewed. Constitutional:       Appearance: He is well-developed. HENT:      Head: Normocephalic and atraumatic. Eyes:      Conjunctiva/sclera: Conjunctivae normal.   Cardiovascular:      Rate and Rhythm: Normal rate and regular rhythm. Pulmonary:      Effort: Pulmonary effort is normal.      Breath sounds: No wheezing. Abdominal:      Palpations: Abdomen is soft. Tenderness: There is no abdominal tenderness. Musculoskeletal:         General: No tenderness. Cervical back: Normal range of motion and neck supple. No rigidity or tenderness. Skin:     General: Skin is warm and dry. Capillary Refill: Capillary refill takes less than 2 seconds. Findings: No rash. Neurological:      Mental Status: He is alert and oriented to person, place, and time. Sensory: No sensory deficit. Motor: No weakness. Psychiatric:         Mood and Affect: Mood normal.        Medical Decision Making  Risk  Prescription drug management. Procedures      Vitals:  Patient Vitals for the past 12 hrs:   Temp Pulse Resp BP SpO2   01/10/23 2222 97.7 °F (36.5 °C) 70 18 (!) 140/86 97 %         Medications ordered:   Medications   ketorolac (TORADOL) injection 15 mg (15 mg IntraMUSCular Given 1/11/23 0119)   ondansetron (ZOFRAN ODT) tablet 4 mg (4 mg Oral Given 1/11/23 0119)         Lab findings:  No results found for this or any previous visit (from the past 12 hour(s)). X-Ray, CT or other radiology findings or impressions:  No orders to display             Progress notes, Consult notes or additional Procedure notes:   No ind for head ct. Neuro intact. No ind for lumbar puncture. Not cw/ ich/mass/meningitis/dissection. 1:41 AM pt says min change w meds, but is driving, declines further tx now, wants rx for ha and dc w f/up w neuro. To dc per pt. Det ret inst given. Urged close f/up. No emc. Diagnosis:   1. Chronic nonintractable headache, unspecified headache type        Disposition: home    Follow-up Information       Follow up With Specialties Details Why [facility] EMERGENCY DEPT Emergency Medicine Go to  As needed, If symptoms worsen [address] [phone]  [address] [facility]  Schedule an appointment as soon as possible for a visit in 2 days or your family physician Dayo Bhatti 3  [address] [facility] [address] [facility] ANTAN Tracey MD Neurology Schedule an appointment as soon as possible for a visit in 3 days  [address] [facility]  [phone]               Patient's Medications   Start Taking    CODEINE-BUTALBITAL-ASPIRIN-CAFFEINE (FIORINAL-CODEINE #3) 77--40 MG PER CAPSULE    Take 1 Capsule by mouth every six (6) hours as needed for Pain for up to 7 days. Max Daily Amount: 4 Capsules. Maximum 6 capsules daily    METOCLOPRAMIDE HCL (REGLAN) 10 MG TABLET    Take 1 Tablet by mouth every eight (8) hours as needed for Nausea for up to 10 days. Continue Taking    IBUPROFEN (MOTRIN) 600 MG TABLET    Take 1 Tablet by mouth every six (6) hours as needed for Pain.    These Medications have changed    No medications on file   Stop Taking    No medications on file

## 2023-01-11 NOTE — ED NOTES
I have reviewed discharge instruction and prescriptions with patient. Patient verbalized understanding and has no further questions at this time. Education taught and patient verbalized understanding of education. Teach back method used. Patients pain 4/10, slight improvement. Patient discharged ambulatory with friend to home.

## 2023-01-11 NOTE — ED TRIAGE NOTES
Pt is ambulatory to triage with complaints of headache since March. MRI at the end of December. States HA is getting worse and the \"stuff youre giving me isn't working\". Pt states he smoked marijuana and that helped his headache. Pt states he is nauseous, dizzy, and lightheaded all the time.

## 2023-01-11 NOTE — ED NOTES
Introduced myself to patient. Pt A/Ox4 resting on stretcher, siderail x1, visitor present. Pt c/o h/a 5/10, pt states it is a constant pain and nothing has worked for him. Does admit to smoking marijuana for which gives him minor relief. VSS, NAD, will continue to monitor and assess.

## 2023-05-14 ENCOUNTER — HOSPITAL ENCOUNTER (EMERGENCY)
Facility: HOSPITAL | Age: 23
Discharge: HOME | End: 2023-05-14
Attending: STUDENT IN AN ORGANIZED HEALTH CARE EDUCATION/TRAINING PROGRAM
Payer: MEDICAID

## 2023-05-14 VITALS
OXYGEN SATURATION: 97 % | HEART RATE: 77 BPM | TEMPERATURE: 96.9 F | BODY MASS INDEX: 27.09 KG/M2 | WEIGHT: 200 LBS | DIASTOLIC BLOOD PRESSURE: 89 MMHG | SYSTOLIC BLOOD PRESSURE: 121 MMHG | HEIGHT: 72 IN | RESPIRATION RATE: 18 BRPM

## 2023-05-14 DIAGNOSIS — R10.84 GENERALIZED ABDOMINAL PAIN: ICD-10-CM

## 2023-05-14 DIAGNOSIS — R51.9 ACUTE NONINTRACTABLE HEADACHE, UNSPECIFIED HEADACHE TYPE: Primary | ICD-10-CM

## 2023-05-14 LAB — GLUCOSE BLD STRIP.AUTO-MCNC: 107 MG/DL (ref 70–110)

## 2023-05-14 PROCEDURE — 6370000000 HC RX 637 (ALT 250 FOR IP): Performed by: STUDENT IN AN ORGANIZED HEALTH CARE EDUCATION/TRAINING PROGRAM

## 2023-05-14 PROCEDURE — 82962 GLUCOSE BLOOD TEST: CPT

## 2023-05-14 PROCEDURE — 6360000002 HC RX W HCPCS: Performed by: STUDENT IN AN ORGANIZED HEALTH CARE EDUCATION/TRAINING PROGRAM

## 2023-05-14 PROCEDURE — 99283 EMERGENCY DEPT VISIT LOW MDM: CPT

## 2023-05-14 RX ORDER — IBUPROFEN 600 MG/1
600 TABLET ORAL
Status: COMPLETED | OUTPATIENT
Start: 2023-05-14 | End: 2023-05-14

## 2023-05-14 RX ORDER — LANOLIN ALCOHOL/MO/W.PET/CERES
400 CREAM (GRAM) TOPICAL ONCE
Status: COMPLETED | OUTPATIENT
Start: 2023-05-14 | End: 2023-05-14

## 2023-05-14 RX ORDER — ACETAMINOPHEN 500 MG
1000 TABLET ORAL
Status: COMPLETED | OUTPATIENT
Start: 2023-05-14 | End: 2023-05-14

## 2023-05-14 RX ORDER — DEXAMETHASONE 4 MG/1
10 TABLET ORAL ONCE
Status: COMPLETED | OUTPATIENT
Start: 2023-05-14 | End: 2023-05-14

## 2023-05-14 RX ADMIN — Medication 400 MG: at 22:09

## 2023-05-14 RX ADMIN — IBUPROFEN 600 MG: 600 TABLET, FILM COATED ORAL at 22:09

## 2023-05-14 RX ADMIN — DEXAMETHASONE 10 MG: 4 TABLET ORAL at 22:09

## 2023-05-14 RX ADMIN — ACETAMINOPHEN 1000 MG: 500 TABLET ORAL at 22:09

## 2023-05-14 ASSESSMENT — PAIN - FUNCTIONAL ASSESSMENT: PAIN_FUNCTIONAL_ASSESSMENT: 0-10

## 2023-05-14 ASSESSMENT — LIFESTYLE VARIABLES
HOW OFTEN DO YOU HAVE A DRINK CONTAINING ALCOHOL: MONTHLY OR LESS
HOW MANY STANDARD DRINKS CONTAINING ALCOHOL DO YOU HAVE ON A TYPICAL DAY: 1 OR 2

## 2023-05-14 ASSESSMENT — PAIN DESCRIPTION - LOCATION: LOCATION: HEAD

## 2023-05-15 NOTE — ED PROVIDER NOTES
[facility] EMERGENCY DEPT  EMERGENCY DEPARTMENT ENCOUNTER      Pt Name: Barbi Enriquez  MRN: [id number]  [place] 2000  Date of evaluation: 5/14/2023  Provider: David Cee MD    CHIEF COMPLAINT       Chief Complaint   Patient presents with    Headache    Abdominal Pain         HISTORY OF PRESENT ILLNESS   (Location/Symptom, Timing/Onset, Context/Setting, Quality, Duration, Modifying Factors, Severity)  Note limiting factors. Barbi Enriquez is a 23 y.o. male who presents to the emergency department for headache and abdominal pain. He states that the headache started this morning. He does have problems with frequent headaches and follows with a neurologist but does not take any medications. Does not take anything today. He describes it as a bilateral frontal headache that has been progressively worsening throughout the day. Denies any new trauma or injury. Denies any nausea or vomiting. Denies any recent fever, sweats or chills. Not sure what makes the pain better or worse. Tried marijuana with no improvement. After the headache started he did also start to develop some abdominal pain. Feels it as a periumbilical pain that Comes and goes. Has had some mild nausea and diarrhea today. No blood. No dysuria, hematuria or urgency. No penile discharge or exposure to STDs. No testicular pain. Nursing Notes were reviewed. REVIEW OF SYSTEMS    (2-9 systems for level 4, 10 or more for level 5)     Constitutional: No fever  HENT: No ear pain  Eyes: No change in vision  Respiratory: No SOB  Cardio: No chest pain  GI: No blood in stool  : No hematuria  MSK: No back pain  Skin: No rashes  Neuro: Positive for headache    Except as noted above the remainder of the review of systems was reviewed and negative. PAST MEDICAL HISTORY     Past Medical History:   Diagnosis Date    Headache          SURGICAL HISTORY     No past surgical history on file.       CURRENT MEDICATIONS       There are no

## 2023-05-15 NOTE — DISCHARGE INSTRUCTIONS
Take medications as needed for your headache. This includes things like extra drink Tylenol every 4-6 hours and ibuprofen 600 mg every 6 hours. Stay hydrated.

## 2023-05-15 NOTE — ED NOTES
I have reviewed discharge instruction with patient. Patient verbalized understanding and has no further questions at this time. Education taught and patient verbalized understanding of education. Teach back method used. Patient discharged ambulatory to home.         Becky Quiñonez RN  05/14/23 [id number]

## 2023-05-15 NOTE — ED TRIAGE NOTES
Pt came in ambulatory to triage c/o headache 8/10 that started about 0400 this morning and lower abdominal pain 4/10 that started about an hour ago. Denies taking anything OTC. States he normally smokes marijuana, but that has not been helping much anymore.     Past Medical History:   Diagnosis Date    Headache